# Patient Record
Sex: FEMALE | Race: WHITE | NOT HISPANIC OR LATINO | Employment: UNEMPLOYED | URBAN - METROPOLITAN AREA
[De-identification: names, ages, dates, MRNs, and addresses within clinical notes are randomized per-mention and may not be internally consistent; named-entity substitution may affect disease eponyms.]

---

## 2018-09-07 ENCOUNTER — OFFICE VISIT (OUTPATIENT)
Dept: URGENT CARE | Facility: URGENT CARE | Age: 28
End: 2018-09-07
Payer: COMMERCIAL

## 2018-09-07 VITALS
OXYGEN SATURATION: 96 % | SYSTOLIC BLOOD PRESSURE: 151 MMHG | TEMPERATURE: 100 F | WEIGHT: 246 LBS | HEART RATE: 99 BPM | DIASTOLIC BLOOD PRESSURE: 106 MMHG | BODY MASS INDEX: 48.04 KG/M2

## 2018-09-07 DIAGNOSIS — J01.90 ACUTE NON-RECURRENT SINUSITIS, UNSPECIFIED LOCATION: ICD-10-CM

## 2018-09-07 DIAGNOSIS — J22 LOWER RESPIRATORY INFECTION: ICD-10-CM

## 2018-09-07 DIAGNOSIS — J02.9 SORETHROAT: Primary | ICD-10-CM

## 2018-09-07 DIAGNOSIS — R03.0 ELEVATED BLOOD PRESSURE READING WITHOUT DIAGNOSIS OF HYPERTENSION: ICD-10-CM

## 2018-09-07 PROBLEM — E66.01 MORBID OBESITY (H): Status: ACTIVE | Noted: 2018-09-07

## 2018-09-07 LAB
DEPRECATED S PYO AG THROAT QL EIA: NORMAL
SPECIMEN SOURCE: NORMAL

## 2018-09-07 PROCEDURE — 87081 CULTURE SCREEN ONLY: CPT | Performed by: FAMILY MEDICINE

## 2018-09-07 PROCEDURE — 87880 STREP A ASSAY W/OPTIC: CPT | Performed by: FAMILY MEDICINE

## 2018-09-07 PROCEDURE — 99203 OFFICE O/P NEW LOW 30 MIN: CPT | Performed by: FAMILY MEDICINE

## 2018-09-07 RX ORDER — AZITHROMYCIN 250 MG/1
TABLET, FILM COATED ORAL
Qty: 6 TABLET | Refills: 0 | Status: SHIPPED | OUTPATIENT
Start: 2018-09-07

## 2018-09-07 ASSESSMENT — PAIN SCALES - GENERAL: PAINLEVEL: NO PAIN (0)

## 2018-09-07 NOTE — MR AVS SNAPSHOT
"              After Visit Summary   2018    Dahlia Matias    MRN: [id number]           Patient Information     Date Of Birth          1990        Visit Information        Provider Department      2018 7:45 PM Claudine Mcleod MD [facility]        Today's Diagnoses     Sorethroat    -  1    Elevated blood pressure reading without diagnosis of hypertension        Lower respiratory infection        Acute non-recurrent sinusitis, unspecified location           Follow-ups after your visit        Who to contact     If you have questions or need follow up information about today's clinic visit or your schedule please contact [facility] directly at [phone].  Normal or non-critical lab and imaging results will be communicated to you by MyChart, letter or phone within 4 business days after the clinic has received the results. If you do not hear from us within 7 days, please contact the clinic through MyChart or phone. If you have a critical or abnormal lab result, we will notify you by phone as soon as possible.  Submit refill requests through [employer] or call your pharmacy and they will forward the refill request to us. Please allow 3 business days for your refill to be completed.          Additional Information About Your Visit        MyChart Information     [employer] lets you send messages to your doctor, view your test results, renew your prescriptions, schedule appointments and more. To sign up, go to [email or web address] . Click on \"Log in\" on the left side of the screen, which will take you to the Welcome page. Then click on \"Sign up Now\" on the right side of the page.     You will be asked to enter the access code listed below, as well as some personal information. Please follow the directions to create your username and password.     Your access code is: [email or web address]  Expires: 2018  8:21 PM     Your access code will  in 90 days. If you need help or " a new code, please call your [facility] or [phone].        Care EveryWhere ID     This is your Care EveryWhere ID. This could be used by other organizations to access your [facility] medical records  [id number]        Your Vitals Were     Pulse Temperature Pulse Oximetry BMI (Body Mass Index)          99 100  F (37.8  C) (Oral) 96% 48.04 kg/m2         Blood Pressure from Last 3 Encounters:   09/07/18 (!) 151/106   07/10/13 118/70   05/13/13 (!) 148/93    Weight from Last 3 Encounters:   09/07/18 246 lb (111.6 kg)   07/10/13 196 lb 1.6 oz (89 kg)   04/03/13 191 lb (86.6 kg)              We Performed the Following     Beta strep group A culture     Rapid strep screen          Today's Medication Changes          These changes are accurate as of 9/7/18  8:22 PM.  If you have any questions, ask your nurse or doctor.               Start taking these medicines.        Dose/Directions    azithromycin 250 MG tablet   Commonly known as:  ZITHROMAX   Used for:  Acute non-recurrent sinusitis, unspecified location, Lower respiratory infection   Started by:  Claudine Mcleod MD        2 tablets the first day, then 1 tablet daily for the next 4 days   Quantity:  6 tablet   Refills:  0         Stop taking these medicines if you haven't already. Please contact your care team if you have questions.     MIRENA (52 MG) 20 MCG/24HR IUD   Generic drug:  levonorgestrel   Stopped by:  Claudine Mcleod MD                Where to get your medicines      These medications were sent to [id number] [place] - [address] [facility] [address]  [address] [facility] [employer] [address]    Hours:  24-hours Phone:  [phone]     azithromycin 250 MG tablet                Primary Care Provider Office Phone # Fax #    [facility] [phone] [address] [facility] [address]        Equal Access to Services     RACHEL WALKER AH: Gordon patel  bishop Patel, wavivian meliaeula, qaseamus kawu koch, aurora jasmynin hayaan charlenecabrera landrum laelainepayton shira. So [facility] [phone].    ATENCIÓN: Si habla español, tiene a adams disposición servicios gratuitos de asistencia lingüística. Larissa al [phone].    We comply with applicable federal civil rights laws and [place] laws. We do not discriminate on the basis of race, color, national origin, age, disability, sex, sexual orientation, or gender identity.            Thank you!     Thank you for choosing [facility]  [facility] your [facility]. Our goal is always to provide you with excellent care. Hearing back from our patients is one way we can continue to improve our services. Please take a few minutes to complete the written survey that you may receive in the mail after your visit with us. Thank you!             Your Updated Medication List - Protect others around you: Learn how to safely use, store and throw away your medicines at www.disposemymeds.org.          This list is accurate as of 9/7/18  8:22 PM.  Always use your most recent med list.                   Brand Name Dispense Instructions for use Diagnosis    azithromycin 250 MG tablet    ZITHROMAX    6 tablet    2 tablets the first day, then 1 tablet daily for the next 4 days    Acute non-recurrent sinusitis, unspecified location, Lower respiratory infection

## 2018-09-07 NOTE — LETTER
September 9, 2018      Dahlia M Florencio  [address] [facility] [address]        Dear ,    We are writing to inform you of your test results.    Your throat culture came back normal.     Resulted Orders   Rapid strep screen   Result Value Ref Range    Specimen Description Throat     Rapid Strep A Screen       NEGATIVE: No Group A streptococcal antigen detected by immunoassay, await culture report.   Beta strep group A culture   Result Value Ref Range    Specimen Description Throat     Culture Micro No beta hemolytic Streptococcus Group A isolated        If you have any questions or concerns, please call the clinic at the number listed above.       Sincerely,        Claudine Mcleod MD/EC CMA

## 2018-09-08 LAB
BACTERIA SPEC CULT: NORMAL
SPECIMEN SOURCE: NORMAL

## 2018-09-08 NOTE — PROGRESS NOTES
New patient  Chief complaint: cough    Started off as a cold one to two weeks ago  But then the cough as persisted  And sinus congestion worsened  Won't go away  Just noticed the fever today  Denies any possibility of pregnancy declined a pregnancy test  Sore throat  fever  - Yes  cough  -Yes  ill contacts - No  able to swallow liquids and solids -YES  other symptoms above  Rash: No  Has tried over the counter medications no relief  because of persistence, patient came in to be seen.    BP elevated today but asymptomatic. No headache no blurring of vision no chest pain no shortness of breath no dizziness no unsteadiness no numbness no weakness      ROS:  denies any exertional chest pain or shortness of breath  denies any unusual rash or joint swelling  denies post-tussive emesis or pertussis like symptoms  Negative for constitutional, eye, ear, nose, throat, skin, respiratory, cardiac, and gastrointestinal other than those outlined in the HPI.    PMH: chart reviewed  FH: chart reviewed    SH: chart reviewed and as above   Physical Exam:   BP (!) 151/106  Pulse 99  Temp 100  F (37.8  C) (Oral)  Wt 246 lb (111.6 kg)  SpO2 96%  BMI 48.04 kg/m2  General : Awake Alert not in any acute cardiorespiratory distress  Head:       Normocephalic Atraumatic  Eyes:    Pupils equally reactive to light and accomodation. Sclera not icteric.   ENT:   midline nasal septum, mild nasal congestion, sinuses  Slightly tender  left ear: no tragal tenderness, no mastoid tenderness, normal EAC, normal TM  right ear: left ear: no tragal tenderness, no mastoid tenderness, normal EAC, normal TM  mouth moist buccal mucosa, Yes hyperemic posterior pharyngeal wall, no trismus  tonsils: tonsils are present and normal  anterior cervical nodes: No tender  posterior cervical nodes: No  palpable  Heart:  Regular in rate and rhythm, no murmurs rubs or gallops  Lungs: Symmetrical Chest Expansion, no retractions, clear breath sounds  Abdomen: soft, no  hepatosplenomegally  Psych: Appropriate mood and affect. Pleasant  Skin: patient undressed to level of his/her comfort. No visible concerning lesions.    Labs: Strep negative      ICD-10-CM    1. Sorethroat J02.9 Rapid strep screen     Beta strep group A culture   2. Elevated blood pressure reading without diagnosis of hypertension R03.0    3. Lower respiratory infection J22 azithromycin (ZITHROMAX) 250 MG tablet   4. Acute non-recurrent sinusitis, unspecified location J01.90 azithromycin (ZITHROMAX) 250 MG tablet     >2 weeks of symptoms sinus and cough   Strep negative. Prescribed with zithromax to cover atypicals  Discussed doing a chest xray - she declines at this time. She will follow up if symptoms persist  Alarm signs or symptoms discussed, if present recommend go to ER   Patient given a prescription for zithromax. Side effects of antibiotic discussed. Aware of small but statistically significant increase cardiovascular risk with antibiotic.  Your blood pressure reading was elevated today.  Recommend that you have it re-checked either at home or at our clinic within a week  Avoid cold medicines that have pseudoephedrin in it, or Sudafed. You may take regular or plain Robitussin or Mucinex  If you are unsure, please ask the pharmacist    If your blood pressure top number (systolic) 180 and above, or the bottom number (diastolic) 120 and above, you need to be seen immediately.  If persistently elevated top number 140 and above, or the bottom number 90 and above, please schedule an appointment to see a provider in clinic within a week.  If you have very elevated blood pressures, accompanied by headache, chest pain, numbness, weakness, slurring of speech, confusion, difficulty walking, call 911 and go to the ER  supportive treatment: advised supportive treatment, Advised to come back in if with any worsening symptoms or if not better despite supportive measures. Especially if with any worsening sore throat,  inability to eat or drink or swallow, or trismus. Symptoms of peritonsillar abscess discussed. Patient voiced understanding.  adverse reactions of medication discussed  OTC medications discussed  advised to come back in right away if with any worsening symptoms or if with no relief despite treatment plan  patient voiced understanding and had no further questions at this time.

## 2018-09-08 NOTE — NURSING NOTE
Chief Complaint   Patient presents with     Pharyngitis     C/o sore throat, fatigue, chills, body aches, fever, cough x 3 weeks.       Initial BP (!) 151/106  Pulse 99  Temp 100  F (37.8  C) (Oral)  Wt 246 lb (111.6 kg)  SpO2 96%  BMI 48.04 kg/m2 Estimated body mass index is 48.04 kg/(m^2) as calculated from the following:    Height as of 4/3/13: 5' (1.524 m).    Weight as of this encounter: 246 lb (111.6 kg)..  BP completed using cuff size: tito [place], PAWAN

## 2018-11-20 ENCOUNTER — TRANSFERRED RECORDS (OUTPATIENT)
Dept: HEALTH INFORMATION MANAGEMENT | Facility: CLINIC | Age: 28
End: 2018-11-20

## 2018-11-20 LAB
CHOLEST SERPL-MCNC: 189 MG/DL (ref 100–199)
CREAT SERPL-MCNC: 0.81 MG/DL (ref 0.57–1.11)
GFR SERPL CREATININE-BSD FRML MDRD: >60 ML/MIN/1.73M2
GLUCOSE SERPL-MCNC: 80 MG/DL (ref 65–100)
HDLC SERPL-MCNC: 34 MG/DL
LDLC SERPL CALC-MCNC: 109 MG/DL
NONHDLC SERPL-MCNC: 155 MG/DL
PAP-ABSTRACT: NORMAL
POTASSIUM SERPL-SCNC: 3.7 MMOL/L (ref 3.5–5)
TRIGL SERPL-MCNC: 232 MG/DL

## 2024-10-19 ENCOUNTER — OFFICE VISIT (OUTPATIENT)
Dept: URGENT CARE | Facility: URGENT CARE | Age: 34
End: 2024-10-19
Payer: COMMERCIAL

## 2024-10-19 VITALS
OXYGEN SATURATION: 97 % | HEART RATE: 131 BPM | RESPIRATION RATE: 16 BRPM | TEMPERATURE: 102.8 F | SYSTOLIC BLOOD PRESSURE: 143 MMHG | DIASTOLIC BLOOD PRESSURE: 87 MMHG

## 2024-10-19 DIAGNOSIS — R50.9 FEVER, UNSPECIFIED FEVER CAUSE: Primary | ICD-10-CM

## 2024-10-19 PROCEDURE — 99203 OFFICE O/P NEW LOW 30 MIN: CPT | Performed by: FAMILY MEDICINE

## 2024-10-19 RX ORDER — CEPHALEXIN 500 MG/1
500 CAPSULE ORAL 4 TIMES DAILY
Qty: 40 CAPSULE | Refills: 0 | Status: SHIPPED | OUTPATIENT
Start: 2024-10-19 | End: 2024-10-29

## 2024-10-19 NOTE — LETTER
October 20, 2024      Dahlia JIMENES Florencio  [address] [facility] [address]        To Whom It May Concern:    Dahlia Matias was seen on 10/19/2024.  Please excuse her work absence on Monday.    Let us know if there are any questions.      Sincerely,        Eduardo Medina MD

## 2024-10-20 NOTE — PROGRESS NOTES
Assessment & Plan     Fever, unspecified fever cause  34-year-old female presented with right upper thigh pain, fever, chills which started today.  History of skin infections.  Physical examination as described.  Differentials discussed in detail including cellulitis however no skin discoloration noted.  Offered ER transfer for comprehensive evaluation which patient declined.  Cephalexin prescribed and recommended well hydration, warm fluids, over-the-counter analgesia, leg elevation when possible.  Instructed to go ER if symptoms persist or worsen otherwise follow-up with PCP next week.  Patient, mother-in-law understood and in agreement with the above plan.  All questions answered.  - cephALEXin (KEFLEX) 500 MG capsule; Take 1 capsule (500 mg) by mouth 4 times daily for 10 days.      Jaqueline Villagomez is a 34 year old, presenting for the following health issues:  Cellulitis (Right leg and upper thigh)    HPI     Concern -   Onset: today   Description: right upper thigh pain, fever and chills  Intensity: moderate  Progression of Symptoms:  worsening  Accompanying Signs & Symptoms: no cough, sore throat, shortness of breath, diarrhea, constipation, urinary difficulty or other relevant systemic symptoms  Previous history of similar problem: skin infections   Therapies tried and outcome: tylenol         Review of Systems  Constitutional, HEENT, cardiovascular, pulmonary, gi and gu systems are negative, except as otherwise noted.      Objective    BP (!) 143/87   Pulse (!) 131   Temp (!) 102.8  F (39.3  C)   Resp 16   SpO2 97%   There is no height or weight on file to calculate BMI.  Physical Exam   GENERAL: alert and in some distress  EYES: Eyes grossly normal to inspection, PERRL and conjunctivae and sclerae normal  NECK: no adenopathy, no asymmetry, masses, or scars  RESP: lungs clear to auscultation - no rales, rhonchi or wheezes  CV: tachycardia, normal S1 S2, no S3 or S4, and no murmur, click or  rub  ABDOMEN: soft, nontender  MS: Subjective right upper thigh pain, mildly tender on palpation, bilateral pedal edema, no skin discoloration noted, Homans' sign negative  SKIN: As above  NEURO: Normal strength and tone, mentation intact and speech normal  PSYCH: mentation appears normal, affect normal/bright    Signed Electronically by: Eduardo Medina MD

## 2024-11-01 ENCOUNTER — OFFICE VISIT (OUTPATIENT)
Dept: FAMILY MEDICINE | Facility: CLINIC | Age: 34
End: 2024-11-01
Payer: COMMERCIAL

## 2024-11-01 VITALS
HEART RATE: 100 BPM | WEIGHT: 269 LBS | DIASTOLIC BLOOD PRESSURE: 88 MMHG | SYSTOLIC BLOOD PRESSURE: 139 MMHG | TEMPERATURE: 98.9 F | OXYGEN SATURATION: 97 % | RESPIRATION RATE: 18 BRPM | HEIGHT: 63 IN | BODY MASS INDEX: 47.66 KG/M2

## 2024-11-01 DIAGNOSIS — L03.115 CELLULITIS OF RIGHT LOWER EXTREMITY: Primary | ICD-10-CM

## 2024-11-01 LAB
ALBUMIN SERPL BCG-MCNC: 3.9 G/DL (ref 3.5–5.2)
ALP SERPL-CCNC: 93 U/L (ref 40–150)
ALT SERPL W P-5'-P-CCNC: 32 U/L (ref 0–50)
ANION GAP SERPL CALCULATED.3IONS-SCNC: 9 MMOL/L (ref 7–15)
AST SERPL W P-5'-P-CCNC: 28 U/L (ref 0–45)
BASOPHILS # BLD AUTO: 0.1 10E3/UL (ref 0–0.2)
BASOPHILS NFR BLD AUTO: 1 %
BILIRUB SERPL-MCNC: 0.4 MG/DL
BUN SERPL-MCNC: 10.2 MG/DL (ref 6–20)
CALCIUM SERPL-MCNC: 9.7 MG/DL (ref 8.8–10.4)
CHLORIDE SERPL-SCNC: 103 MMOL/L (ref 98–107)
CREAT SERPL-MCNC: 0.71 MG/DL (ref 0.51–0.95)
EGFRCR SERPLBLD CKD-EPI 2021: >90 ML/MIN/1.73M2
EOSINOPHIL # BLD AUTO: 0.2 10E3/UL (ref 0–0.7)
EOSINOPHIL NFR BLD AUTO: 2 %
ERYTHROCYTE [DISTWIDTH] IN BLOOD BY AUTOMATED COUNT: 13.3 % (ref 10–15)
GLUCOSE SERPL-MCNC: 109 MG/DL (ref 70–99)
HCO3 SERPL-SCNC: 27 MMOL/L (ref 22–29)
HCT VFR BLD AUTO: 38.2 % (ref 35–47)
HGB BLD-MCNC: 12.3 G/DL (ref 11.7–15.7)
IMM GRANULOCYTES # BLD: 0.1 10E3/UL
IMM GRANULOCYTES NFR BLD: 1 %
LYMPHOCYTES # BLD AUTO: 1.9 10E3/UL (ref 0.8–5.3)
LYMPHOCYTES NFR BLD AUTO: 20 %
MCH RBC QN AUTO: 28.1 PG (ref 26.5–33)
MCHC RBC AUTO-ENTMCNC: 32.2 G/DL (ref 31.5–36.5)
MCV RBC AUTO: 87 FL (ref 78–100)
MONOCYTES # BLD AUTO: 0.6 10E3/UL (ref 0–1.3)
MONOCYTES NFR BLD AUTO: 6 %
NEUTROPHILS # BLD AUTO: 6.9 10E3/UL (ref 1.6–8.3)
NEUTROPHILS NFR BLD AUTO: 71 %
PLATELET # BLD AUTO: 559 10E3/UL (ref 150–450)
POTASSIUM SERPL-SCNC: 4.2 MMOL/L (ref 3.4–5.3)
PROT SERPL-MCNC: 7.1 G/DL (ref 6.4–8.3)
RBC # BLD AUTO: 4.38 10E6/UL (ref 3.8–5.2)
SODIUM SERPL-SCNC: 139 MMOL/L (ref 135–145)
WBC # BLD AUTO: 9.8 10E3/UL (ref 4–11)

## 2024-11-01 PROCEDURE — 36415 COLL VENOUS BLD VENIPUNCTURE: CPT | Performed by: PHYSICIAN ASSISTANT

## 2024-11-01 PROCEDURE — 85025 COMPLETE CBC W/AUTO DIFF WBC: CPT | Performed by: PHYSICIAN ASSISTANT

## 2024-11-01 PROCEDURE — 80053 COMPREHEN METABOLIC PANEL: CPT | Performed by: PHYSICIAN ASSISTANT

## 2024-11-01 PROCEDURE — G2211 COMPLEX E/M VISIT ADD ON: HCPCS | Performed by: PHYSICIAN ASSISTANT

## 2024-11-01 PROCEDURE — 99213 OFFICE O/P EST LOW 20 MIN: CPT | Performed by: PHYSICIAN ASSISTANT

## 2024-11-01 RX ORDER — FUROSEMIDE 20 MG/1
20 TABLET ORAL DAILY
COMMUNITY
Start: 2023-12-04

## 2024-11-01 RX ORDER — SULFAMETHOXAZOLE AND TRIMETHOPRIM 800; 160 MG/1; MG/1
1 TABLET ORAL 2 TIMES DAILY
Qty: 20 TABLET | Refills: 0 | Status: SHIPPED | OUTPATIENT
Start: 2024-11-01 | End: 2024-11-11

## 2024-11-01 ASSESSMENT — PAIN SCALES - GENERAL: PAINLEVEL_OUTOF10: NO PAIN (0)

## 2024-11-01 NOTE — PROGRESS NOTES
Assessment & Plan       ICD-10-CM    1. Cellulitis of right lower extremity  L03.115 sulfamethoxazole-trimethoprim (BACTRIM DS) 800-160 MG tablet     CBC with platelets and differential     Comprehensive metabolic panel (BMP + Alb, Alk Phos, ALT, AST, Total. Bili, TP)     CBC with platelets and differential     Comprehensive metabolic panel (BMP + Alb, Alk Phos, ALT, AST, Total. Bili, TP)      Talk to patient about her concerns.  We will change her antibiotics to Bactrim twice a day for 10 days.  Warning signs side effects were discussed.  If her symptoms persist may need follow-up with infectious disease.  Labs are pending.  Recheck 1 wk.  The longitudinal plan of care for the diagnosis(es)/condition(s) as documented were addressed during this visit. Due to the added complexity in care, I will continue to support Dahlia in the subsequent management and with ongoing continuity of care.    Jaqueline Villagomez is a 34 year old, presenting for the following health issues:  Musculoskeletal Problem        11/1/2024    10:13 AM   Additional Questions   Roomed by fer   Accompanied by self         11/1/2024    10:13 AM   Patient Reported Additional Medications   Patient reports taking the following new medications no     History of Present Illness       Reason for visit:  Celltuis    She eats 2-3 servings of fruits and vegetables daily.She consumes 3 sweetened beverage(s) daily.She exercises with enough effort to increase her heart rate 60 or more minutes per day.  She exercises with enough effort to increase her heart rate 6 days per week.   She is taking medications regularly.  Patient is seen here today with a history of hypertension for follow-up of right lower leg cellulitis.  She states has been a recurrent problem for her.  Last was seen in urgent care on 10/19.  She was given Keflex did help her symptoms but never fully resolved.  This is been a recurrent problem for her in the past when she was pregnant a  "couple years ago.  She states it took a couple rounds of antibiotics to help this calm down.  She denies any fevers chills or bodyaches.  She states is very tender.      Review of Systems  Constitutional, HEENT, cardiovascular, pulmonary, gi and gu systems are negative, except as otherwise noted.      Objective    /88   Pulse 100   Temp 98.9  F (37.2  C) (Tympanic)   Resp 18   Ht 1.6 m (5' 3\")   Wt 122 kg (269 lb)   SpO2 97%   BMI 47.65 kg/m    Body mass index is 47.65 kg/m .  Physical Exam   GENERAL: alert and no distress-obese female  Examination of her right lower leg she has a large area of erythematous raised tender area.  Calves are soft nontender neuro vas intact distally.  Picture was taken for her chart for reference.    Labs pending        Signed Electronically by: Kleber Mix PA-C    "

## 2024-11-03 ENCOUNTER — HEALTH MAINTENANCE LETTER (OUTPATIENT)
Age: 34
End: 2024-11-03

## 2024-11-06 NOTE — RESULT ENCOUNTER NOTE
Ms. Matias,    All of your labs were normal/near normal for you.    Please contact the clinic if you have additional questions.  Thank you.    Sincerely,    Kleber Mix PA-C

## 2024-12-05 ENCOUNTER — TELEPHONE (OUTPATIENT)
Dept: FAMILY MEDICINE | Facility: CLINIC | Age: 34
End: 2024-12-05

## 2024-12-05 ENCOUNTER — OFFICE VISIT (OUTPATIENT)
Dept: FAMILY MEDICINE | Facility: CLINIC | Age: 34
End: 2024-12-05
Payer: COMMERCIAL

## 2024-12-05 VITALS
SYSTOLIC BLOOD PRESSURE: 144 MMHG | TEMPERATURE: 99 F | HEIGHT: 63 IN | DIASTOLIC BLOOD PRESSURE: 92 MMHG | HEART RATE: 85 BPM | OXYGEN SATURATION: 98 % | BODY MASS INDEX: 48.02 KG/M2 | WEIGHT: 271 LBS | RESPIRATION RATE: 16 BRPM

## 2024-12-05 DIAGNOSIS — Z51.89 VISIT FOR WOUND CHECK: Primary | ICD-10-CM

## 2024-12-05 DIAGNOSIS — E66.01 MORBID OBESITY (H): ICD-10-CM

## 2024-12-05 DIAGNOSIS — I10 HYPERTENSION GOAL BP (BLOOD PRESSURE) < 140/90: ICD-10-CM

## 2024-12-05 ASSESSMENT — PAIN SCALES - GENERAL: PAINLEVEL_OUTOF10: NO PAIN (0)

## 2024-12-05 NOTE — TELEPHONE ENCOUNTER
Prior Authorization Retail Medication Request    Medication/Dose: Zepbound 2.5mg  Diagnosis and ICD code (if different than what is on RX):    New/renewal/insurance change PA/secondary ins. PA:  Previously Tried and Failed:    Rationale:      Insurance   Primary [facility]  Insurance ID:  [id number]    Thank You  Vandana Fierro, [facility]  [phone]

## 2024-12-05 NOTE — PROGRESS NOTES
Assessment & Plan       ICD-10-CM    1. Visit for wound check  Z51.89       2. Morbid obesity (H)  E66.01 tirzepatide-Weight Management (ZEPBOUND) 2.5 MG/0.5ML prefilled pen     tirzepatide-Weight Management (ZEPBOUND) 5 MG/0.5ML prefilled pen      3. Hypertension goal BP (blood pressure) < 140/90  I10       1.  We continue to discuss conservative wound care treatment and watching for signs of cellulitis to return.  2.  We talked about GLP-1 weekly injectables warning signs side effects discussed.  This would based on insurance coverage.  Follow-up in 2 months after starting medication.  3.  Blood pressure is elevated today we will continue monitor this at home.  We talked about diet and exercise and low-salt diet.  Recheck again at follow-up.    The longitudinal plan of care for the diagnosis(es)/condition(s) as documented were addressed during this visit. Due to the added complexity in care, I will continue to support Dahlia in the subsequent management and with ongoing continuity of care.    Jaqueline Villagomez is a 34 year old, presenting for the following health issues:  RECHECK    History of Present Illness       Reason for visit:  Cellutis    She eats 2-3 servings of fruits and vegetables daily.She consumes 2 sweetened beverage(s) daily.She exercises with enough effort to increase her heart rate 60 or more minutes per day.  She exercises with enough effort to increase her heart rate 7 days per week.   She is taking medications regularly.  Patient comes in today for follow-up of right lower leg cellulitis.  She finished her antibiotics and overall she states she is doing much better.  She continues to have scab formation she has been treated with lotion no recent fevers chills or bodyaches or any erythema or drainage.    She is also interested in GLP-1 will weekly injectables if her insurance covers it.  She states she has a friend has been on it and had successful weight loss.      Review of  "Systems  Constitutional, HEENT, cardiovascular, pulmonary, gi and gu systems are negative, except as otherwise noted.      Objective    BP (!) 144/92   Pulse 85   Temp 99  F (37.2  C) (Tympanic)   Resp 16   Ht 1.6 m (5' 3\")   Wt 122.9 kg (271 lb)   SpO2 98%   BMI 48.01 kg/m    Body mass index is 48.01 kg/m .  Physical Exam   GENERAL: alert and no distress  Right lower leg: Healing scab diffusely in the lower leg no erythema no tenderness or drainage diffuse edema in general bilaterally.  Calves soft nontender neuro vas intact distally.              Signed Electronically by: Kleber Mix PA-C    "

## 2024-12-06 NOTE — TELEPHONE ENCOUNTER
PA Initiation    Medication: ZEPBOUND 2.5 MG/0.5ML SC SOAJ  Insurance Company: [employer] ([facility]) - Phone [phone] Fax [phone]  Pharmacy Filling the Rx:    Filling Pharmacy Phone:    Filling Pharmacy Fax:    Start Date: 12/6/2024

## 2024-12-09 NOTE — TELEPHONE ENCOUNTER
PRIOR AUTHORIZATION DENIED    Medication: ZEPBOUND 2.5 MG/0.5ML SC SOAJ  Insurance Company: [employer] ([facility]) - Phone [phone] Fax [phone]  Denial Date: 12/6/2024  Denial Reason(s): excluded drug from plan  Appeal Information: see attached  Patient Notified: no

## 2025-02-06 ENCOUNTER — OFFICE VISIT (OUTPATIENT)
Dept: FAMILY MEDICINE | Facility: CLINIC | Age: 35
End: 2025-02-06
Payer: COMMERCIAL

## 2025-02-06 ENCOUNTER — TELEPHONE (OUTPATIENT)
Dept: FAMILY MEDICINE | Facility: CLINIC | Age: 35
End: 2025-02-06

## 2025-02-06 VITALS
RESPIRATION RATE: 16 BRPM | OXYGEN SATURATION: 97 % | HEIGHT: 63 IN | BODY MASS INDEX: 46.78 KG/M2 | TEMPERATURE: 97.8 F | WEIGHT: 264 LBS | HEART RATE: 82 BPM | SYSTOLIC BLOOD PRESSURE: 154 MMHG | DIASTOLIC BLOOD PRESSURE: 101 MMHG

## 2025-02-06 DIAGNOSIS — I10 HYPERTENSION GOAL BP (BLOOD PRESSURE) < 140/90: Primary | ICD-10-CM

## 2025-02-06 DIAGNOSIS — R60.0 BILATERAL LEG EDEMA: ICD-10-CM

## 2025-02-06 DIAGNOSIS — E66.01 MORBID OBESITY (H): ICD-10-CM

## 2025-02-06 RX ORDER — FUROSEMIDE 20 MG/1
20 TABLET ORAL
Qty: 60 TABLET | Refills: 0 | Status: SHIPPED | OUTPATIENT
Start: 2025-02-06

## 2025-02-06 RX ORDER — TIRZEPATIDE 2.5 MG/.5ML
2.5 INJECTION, SOLUTION SUBCUTANEOUS
Qty: 2 ML | Refills: 0 | Status: SHIPPED | OUTPATIENT
Start: 2025-02-06 | End: 2025-03-08

## 2025-02-06 ASSESSMENT — PAIN SCALES - GENERAL: PAINLEVEL_OUTOF10: MILD PAIN (2)

## 2025-02-06 NOTE — PROGRESS NOTES
"  Assessment & Plan       ICD-10-CM    1. Hypertension goal BP (blood pressure) < 140/90  I10 furosemide (LASIX) 20 MG tablet     Tirzepatide (MOUNJARO) 2.5 MG/0.5ML SOAJ      2. Bilateral leg edema  R60.0 furosemide (LASIX) 20 MG tablet      3. Morbid obesity (H)  E66.01 Tirzepatide (MOUNJARO) 2.5 MG/0.5ML SOAJ      Talk to patient about her concerns.  Working to increase her Lasix to 20 mg twice daily and recheck in 2 weeks.  Warning signs side effects were discussed.  I reordered Zepbound.  Warning signs side effects were discussed recheck in a month.      BMI  Estimated body mass index is 46.77 kg/m  as calculated from the following:    Height as of this encounter: 1.6 m (5' 3\").    Weight as of this encounter: 119.7 kg (264 lb).   Weight management plan: Discussed healthy diet and exercise guidelines      The longitudinal plan of care for the diagnosis(es)/condition(s) as documented were addressed during this visit. Due to the added complexity in care, I will continue to support Dahlia in the subsequent management and with ongoing continuity of care.    Jaqueline Villagomez is a 34 year old, presenting for the following health issues:  RECHECK    History of Present Illness       Reason for visit:  Cellutis    She eats 2-3 servings of fruits and vegetables daily.She consumes 1 sweetened beverage(s) daily.She exercises with enough effort to increase her heart rate 60 or more minutes per day.  She exercises with enough effort to increase her heart rate 6 days per week.   She is taking medications regularly.  Patient is here for follow-up of a history of right leg cellulitis.  She is just double check to make sure is not getting worse.  She has not noticed any increased pain or swelling or redness.  She has a history of bilateral leg edema and on Lasix 20 mg a day.  She does not feel it is helping much.  She also has a history of hypertension that has not been controlled.  She has been changing insurance and will " "legs not found to be ordered as she believes is covered by insurance now.  She denies any fevers chills or bodyaches along with no signs of chest pain shortness of breath.    Review of Systems  Constitutional, HEENT, cardiovascular, pulmonary, gi and gu systems are negative, except as otherwise noted.      Objective    BP (!) 154/101   Pulse 82   Temp 97.8  F (36.6  C) (Tympanic)   Resp 16   Ht 1.6 m (5' 3\")   Wt 119.7 kg (264 lb)   SpO2 97%   BMI 46.77 kg/m    Body mass index is 46.77 kg/m .  Physical Exam   GENERAL: alert and no distress  RESP: lungs clear to auscultation - no rales, rhonchi or wheezes  CV: regular rate and rhythm, normal S1 S2, no S3 or S4, no murmur, click or rub, bart peripheral edema .  She has some slight leftover scarring from her previous cellulitis no tenderness of her left lower medial shin region.  Calves are soft nontender neuro vas intact distally.        Signed Electronically by: Kleber Mix PA-C    "

## 2025-02-06 NOTE — TELEPHONE ENCOUNTER
Prior Authorization Retail Medication Request    Medication/Dose: Mounjaro 2.5mg  Diagnosis and ICD code (if different than what is on RX):    New/renewal/insurance change PA/secondary ins. PA:  Previously Tried and Failed:    Rationale:      Insurance   Primary: [employer]  Insurance ID:  [id number]    Thank You  Vandana Fierro [facility]  [phone]

## 2025-02-08 NOTE — TELEPHONE ENCOUNTER
PA Initiation    Medication: MOUNJARO 2.5 MG/0.5ML SC SOAJ  Insurance Company: [employer]ty PA's - Phone [phone] Fax [phone]  Pharmacy Filling the Rx: [place], [place] - [address] [facility], [address]  Filling Pharmacy Phone: [phone]  Filling Pharmacy Fax:    Start Date: 2/8/2025

## 2025-02-11 NOTE — TELEPHONE ENCOUNTER
PRIOR AUTHORIZATION DENIED    Medication: MOUNJARO 2.5 MG/0.5ML SC SOAJ  Insurance Company: [employer] PA's - Phone [phone] Fax [phone]  Denial Date: 2/8/2025  Denial Reason(s):     Appeal Information:     Patient Notified: No

## 2025-02-20 ENCOUNTER — OFFICE VISIT (OUTPATIENT)
Dept: FAMILY MEDICINE | Facility: CLINIC | Age: 35
End: 2025-02-20
Payer: COMMERCIAL

## 2025-02-20 VITALS
TEMPERATURE: 99 F | BODY MASS INDEX: 47.84 KG/M2 | OXYGEN SATURATION: 97 % | HEIGHT: 63 IN | DIASTOLIC BLOOD PRESSURE: 90 MMHG | WEIGHT: 270 LBS | RESPIRATION RATE: 16 BRPM | SYSTOLIC BLOOD PRESSURE: 150 MMHG | HEART RATE: 88 BPM

## 2025-02-20 DIAGNOSIS — R60.0 BILATERAL LEG EDEMA: ICD-10-CM

## 2025-02-20 DIAGNOSIS — I10 HYPERTENSION GOAL BP (BLOOD PRESSURE) < 140/90: Primary | ICD-10-CM

## 2025-02-20 DIAGNOSIS — E66.01 MORBID OBESITY (H): ICD-10-CM

## 2025-02-20 LAB
ANION GAP SERPL CALCULATED.3IONS-SCNC: 13 MMOL/L (ref 7–15)
BUN SERPL-MCNC: 11.9 MG/DL (ref 6–20)
CALCIUM SERPL-MCNC: 9.5 MG/DL (ref 8.8–10.4)
CHLORIDE SERPL-SCNC: 103 MMOL/L (ref 98–107)
CREAT SERPL-MCNC: 0.73 MG/DL (ref 0.51–0.95)
EGFRCR SERPLBLD CKD-EPI 2021: >90 ML/MIN/1.73M2
GLUCOSE SERPL-MCNC: 123 MG/DL (ref 70–99)
HCO3 SERPL-SCNC: 23 MMOL/L (ref 22–29)
POTASSIUM SERPL-SCNC: 3.9 MMOL/L (ref 3.4–5.3)
SODIUM SERPL-SCNC: 139 MMOL/L (ref 135–145)

## 2025-02-20 RX ORDER — LISINOPRIL 10 MG/1
10 TABLET ORAL DAILY
Qty: 30 TABLET | Refills: 0 | Status: SHIPPED | OUTPATIENT
Start: 2025-02-20

## 2025-02-20 RX ORDER — FUROSEMIDE 20 MG/1
20 TABLET ORAL
Qty: 180 TABLET | Refills: 0 | Status: SHIPPED | OUTPATIENT
Start: 2025-02-20 | End: 2025-05-21

## 2025-02-20 ASSESSMENT — PAIN SCALES - GENERAL: PAINLEVEL_OUTOF10: NO PAIN (0)

## 2025-02-20 NOTE — PROGRESS NOTES
Assessment & Plan       ICD-10-CM    1. Hypertension goal BP (blood pressure) < 140/90  I10 Basic metabolic panel  (Ca, Cl, CO2, Creat, Gluc, K, Na, BUN)     lisinopril (ZESTRIL) 10 MG tablet     furosemide (LASIX) 20 MG tablet     Adult Comprehensive Weight Management  Referral     Basic metabolic panel  (Ca, Cl, CO2, Creat, Gluc, K, Na, BUN)      2. Bilateral leg edema  R60.0 furosemide (LASIX) 20 MG tablet     Lymphedema Therapy  Referral      3. Morbid obesity (H)  E66.01 Adult Comprehensive Weight Management  Referral      Work on Healthy diet and exercise. Getting heart rate elevated for 30 mins most days of week.  Will add lisinopril. warning signs discussed. side effects discussed recheck blood pressure in 4 wk  2. Referral placed   3. Referral placed.     The longitudinal plan of care for the diagnosis(es)/condition(s) as documented were addressed during this visit. Due to the added complexity in care, I will continue to support Dahlia in the subsequent management and with ongoing continuity of care.    Subjective   Dahlia is a 34 year old, presenting for the following health issues:  Hypertension        2/20/2025    11:08 AM   Additional Questions   Roomed by Zaina   Accompanied by self         2/20/2025    11:08 AM   Patient Reported Additional Medications   Patient reports taking the following new medications no     History of Present Illness       Reason for visit:  Cellutis    She eats 4 or more servings of fruits and vegetables daily.She consumes 2 sweetened beverage(s) daily.She exercises with enough effort to increase her heart rate 60 or more minutes per day.  She exercises with enough effort to increase her heart rate 7 days per week.   She is taking medications regularly.     Hypertension Follow-up    Do you check your blood pressure regularly outside of the clinic? Yes   Are you following a low salt diet? Yes  Are your blood pressures ever more than 140 on the top  "number (systolic) OR more   than 90 on the bottom number (diastolic), for example 140/90? Yes  Patient follow-up today for hypertension she had recently increased her Lasix to twice a day.  She has not noticed much changes in her edema.  No chest pain or short of breath. No headache or dizziness.     Weight:   GLP-1 medications not covered.     Review of Systems  Constitutional, HEENT, cardiovascular, pulmonary, gi and gu systems are negative, except as otherwise noted.      Objective    BP (!) 150/90   Pulse 88   Temp 99  F (37.2  C) (Tympanic)   Resp 16   Ht 1.6 m (5' 3\")   Wt 122.5 kg (270 lb)   SpO2 97%   BMI 47.83 kg/m    Body mass index is 47.83 kg/m .  Physical Exam   GENERAL: alert and no distress  RESP: lungs clear to auscultation - no rales, rhonchi or wheezes  CV: regular rate and rhythm, normal S1 S2, no S3 or S4, no murmur, click or rub, no peripheral edema  MS: no gross musculoskeletal defects noted, bart edema    Labs pending        Signed Electronically by: Kleber Mix PA-C    "

## 2025-02-20 NOTE — PROGRESS NOTES
{PROVIDER CHARTING PREFERENCE:059656}    Jaqueline Villagomez is a 34 year old, presenting for the following health issues:  Hypertension      2/20/2025    11:08 AM   Additional Questions   Roomed by Zaina   Accompanied by self         2/20/2025    11:08 AM   Patient Reported Additional Medications   Patient reports taking the following new medications no     HPI     {MA/LPN/RN Pre-Provider Visit Orders- hCG/UA/Strep (Optional):013851}  {SUPERLIST (Optional):823010}  {additonal problems for provider to add (Optional):280333}    {ROS Picklists (Optional):781415}      Objective    There were no vitals taken for this visit.  There is no height or weight on file to calculate BMI.  Physical Exam   {Exam List (Optional):928506}    {Diagnostic Test Results (Optional):150500}        Signed Electronically by: Kleber Mix PA-C  {Email feedback regarding this note to [email or web address]   :099373}

## 2025-03-13 DIAGNOSIS — I10 HYPERTENSION GOAL BP (BLOOD PRESSURE) < 140/90: ICD-10-CM

## 2025-03-13 RX ORDER — LISINOPRIL 10 MG/1
10 TABLET ORAL DAILY
Qty: 30 TABLET | Refills: 0 | Status: SHIPPED | OUTPATIENT
Start: 2025-03-13

## 2025-03-19 ENCOUNTER — E-VISIT (OUTPATIENT)
Dept: FAMILY MEDICINE | Facility: CLINIC | Age: 35
End: 2025-03-19
Payer: COMMERCIAL

## 2025-03-19 DIAGNOSIS — R69 DIAGNOSIS DEFERRED: Primary | ICD-10-CM

## 2025-03-19 PROCEDURE — 99207 PR NON-BILLABLE SERV PER CHARTING: CPT | Performed by: PHYSICIAN ASSISTANT

## 2025-03-20 ENCOUNTER — OFFICE VISIT (OUTPATIENT)
Dept: FAMILY MEDICINE | Facility: CLINIC | Age: 35
End: 2025-03-20
Payer: COMMERCIAL

## 2025-03-20 VITALS
DIASTOLIC BLOOD PRESSURE: 90 MMHG | RESPIRATION RATE: 18 BRPM | WEIGHT: 266 LBS | OXYGEN SATURATION: 99 % | SYSTOLIC BLOOD PRESSURE: 150 MMHG | HEART RATE: 91 BPM | BODY MASS INDEX: 47.13 KG/M2 | HEIGHT: 63 IN | TEMPERATURE: 98.7 F

## 2025-03-20 DIAGNOSIS — I10 HYPERTENSION GOAL BP (BLOOD PRESSURE) < 140/90: ICD-10-CM

## 2025-03-20 DIAGNOSIS — J40 BRONCHITIS: Primary | ICD-10-CM

## 2025-03-20 RX ORDER — AZITHROMYCIN 250 MG/1
TABLET, FILM COATED ORAL
Qty: 6 TABLET | Refills: 0 | Status: SHIPPED | OUTPATIENT
Start: 2025-03-20 | End: 2025-03-25

## 2025-03-20 RX ORDER — ALBUTEROL SULFATE 90 UG/1
2 INHALANT RESPIRATORY (INHALATION) EVERY 6 HOURS PRN
Qty: 18 G | Refills: 0 | Status: SHIPPED | OUTPATIENT
Start: 2025-03-20

## 2025-03-20 RX ORDER — LISINOPRIL 20 MG/1
20 TABLET ORAL DAILY
Qty: 30 TABLET | Refills: 0 | Status: SHIPPED | OUTPATIENT
Start: 2025-03-20

## 2025-03-20 ASSESSMENT — PAIN SCALES - GENERAL: PAINLEVEL_OUTOF10: MILD PAIN (3)

## 2025-03-20 NOTE — PROGRESS NOTES
Assessment & Plan       ICD-10-CM    1. Bronchitis  J40 azithromycin (ZITHROMAX) 250 MG tablet     albuterol (PROAIR HFA/PROVENTIL HFA/VENTOLIN HFA) 108 (90 Base) MCG/ACT inhaler      2. Hypertension goal BP (blood pressure) < 140/90  I10 lisinopril (ZESTRIL) 20 MG tablet      Warning signs discussed. Side effects discussed. Symptomatic treatment such as pushing fluids. Ok for over the counter acetaminophen and /or non-steroidal anti-inflammatory medication as needed. Recheck 2 wks as needed  Will increase lisinopril to 20mg daily. warning signs discussed. side effects discussed. Recheck blood pressure with RN in 2 wks.     The longitudinal plan of care for the diagnosis(es)/condition(s) as documented were addressed during this visit. Due to the added complexity in care, I will continue to support Dahlia in the subsequent management and with ongoing continuity of care.  Jaqueline Villagomez is a 34 year old, presenting for the following health issues:  Hypertension and URI        3/20/2025    11:31 AM   Additional Questions   Roomed by fer   Accompanied by self         3/20/2025    11:31 AM   Patient Reported Additional Medications   Patient reports taking the following new medications no     History of Present Illness       Reason for visit:  Coughing  Symptom onset:  1-2 weeks ago  Symptoms include:  Coughing  Symptom intensity:  Severe  Symptom progression:  Worsening  Had these symptoms before:  Yes  Has tried/received treatment for these symptoms:  Yes  Previous treatment was successful:  No  What makes it worse:  Coughing  What makes it better:  Cough drops   She is taking medications regularly.    Started with fever, that is better now. Worsening cough, wheezing over 10 days. nausea, vomiting, diarrhea.   Posterior nasal drainage with headache.       Hypertension Follow-up    Do you check your blood pressure regularly outside of the clinic? Yes   Are you following a low salt diet? Yes  Are your blood  "pressures ever more than 140 on the top number (systolic) OR more   than 90 on the bottom number (diastolic), for example 140/90? Yes  Blood pressure 150/90 at home.   No chest pain or short of breath. No headache or dizziness.       Review of Systems  Constitutional, HEENT, cardiovascular, pulmonary, gi and gu systems are negative, except as otherwise noted.      Objective    BP (!) 150/90   Pulse 91   Temp 98.7  F (37.1  C) (Tympanic)   Resp 18   Ht 1.6 m (5' 3\")   Wt 120.7 kg (266 lb)   SpO2 99%   BMI 47.12 kg/m    Body mass index is 47.12 kg/m .  Physical Exam   GENERAL: healthy, alert and no distress  Head: Normocephalic, atraumatic.  Eyes: Conjunctiva clear, non icteric. PERRLA.  Ears: External ears and TMs normal BL.  Nose: Septum midline, nasal mucosa pink and moist. No discharge.  Mouth / Throat: Normal dentition.  No oral lesions. Pharynx non erythematous, tonsils without hypertrophy.  Neck: Supple, no enlarged LN, trachea midline.   RESP: diffuse rhonchi and wheezes   CV: regular rate and rhythm, normal S1 S2, no S3 or S4, no murmur, click or rub, no peripheral edema and peripheral pulses strong  MS: no gross musculoskeletal defects noted, no edema      Labs reviewed        Signed Electronically by: Kleber Mix PA-C    "

## 2025-04-03 ENCOUNTER — OFFICE VISIT (OUTPATIENT)
Dept: FAMILY MEDICINE | Facility: CLINIC | Age: 35
End: 2025-04-03
Payer: COMMERCIAL

## 2025-04-03 VITALS
BODY MASS INDEX: 48.54 KG/M2 | TEMPERATURE: 98.6 F | RESPIRATION RATE: 16 BRPM | DIASTOLIC BLOOD PRESSURE: 90 MMHG | SYSTOLIC BLOOD PRESSURE: 144 MMHG | OXYGEN SATURATION: 98 % | WEIGHT: 274 LBS | HEART RATE: 88 BPM

## 2025-04-03 DIAGNOSIS — J06.9 UPPER RESPIRATORY TRACT INFECTION, UNSPECIFIED TYPE: ICD-10-CM

## 2025-04-03 DIAGNOSIS — I10 HYPERTENSION GOAL BP (BLOOD PRESSURE) < 140/90: Primary | ICD-10-CM

## 2025-04-03 RX ORDER — LISINOPRIL 20 MG/1
20 TABLET ORAL 2 TIMES DAILY
Qty: 180 TABLET | Refills: 1 | Status: SHIPPED | OUTPATIENT
Start: 2025-04-03

## 2025-04-03 RX ORDER — LISINOPRIL 20 MG/1
20 TABLET ORAL 2 TIMES DAILY
Qty: 180 TABLET | Refills: 1 | Status: SHIPPED | OUTPATIENT
Start: 2025-04-03 | End: 2025-04-03

## 2025-04-03 ASSESSMENT — PAIN SCALES - GENERAL: PAINLEVEL_OUTOF10: MODERATE PAIN (4)

## 2025-04-03 NOTE — PROGRESS NOTES
Assessment & Plan       ICD-10-CM    1. Hypertension goal BP (blood pressure) < 140/90  I10 lisinopril (ZESTRIL) 20 MG tablet     DISCONTINUED: lisinopril (ZESTRIL) 20 MG tablet      2. Upper respiratory tract infection, unspecified type  J06.9       Increase lisinopril 20mg twice a day. warning signs discussed. side effects discussed. Recheck 4 wks.   Warning signs discussed. Side effects discussed. Symptomatic treatment such as pushing fluids. Ok for over the counter acetaminophen and /or non-steroidal anti-inflammatory medication as needed.     The longitudinal plan of care for the diagnosis(es)/condition(s) as documented were addressed during this visit. Due to the added complexity in care, I will continue to support Dahlia in the subsequent management and with ongoing continuity of care.  Subjective   Dahlia is a 35 year old, presenting for the following health issues:  RECHECK    History of Present Illness       Reason for visit:  Coughing  Symptom onset:  1-2 weeks ago  Symptoms include:  Coughing  Symptom intensity:  Severe  Symptom progression:  Worsening  Had these symptoms before:  Yes  Has tried/received treatment for these symptoms:  Yes  Previous treatment was successful:  No  What makes it worse:  Coughing  What makes it better:  Cough drops   She is taking medications regularly.      Family sick. Cough, runny nose. No wheezing or short of breath.   Tx; over the counter meds.     Hypertension Follow-up    Do you check your blood pressure regularly outside of the clinic? Yes   Are you following a low salt diet? Yes  Are your blood pressures ever more than 140 on the top number (systolic) OR more   than 90 on the bottom number (diastolic), for example 140/90? Yes    BP Readings from Last 2 Encounters:   04/03/25 (!) 144/90   03/20/25 (!) 150/90     Review of Systems  Constitutional, HEENT, cardiovascular, pulmonary, gi and gu systems are negative, except as otherwise noted.      Objective    BP (!)  144/90   Pulse 88   Temp 98.6  F (37  C) (Tympanic)   Resp 16   Wt 124.3 kg (274 lb)   SpO2 98%   BMI 48.54 kg/m    Body mass index is 48.54 kg/m .  Physical Exam   GENERAL: alert and no distress  EYES: Eyes grossly normal to inspection, PERRL and conjunctivae and sclerae normal  HENT: ear canals and TM's normal, nose and mouth without ulcers or lesions  NECK: no adenopathy, no asymmetry, masses, or scars  RESP: lungs clear to auscultation - no rales, rhonchi or wheezes  CV: regular rate and rhythm, normal S1 S2, no S3 or S4, no murmur, click or rub.          Signed Electronically by: Kleber Mix PA-C

## 2025-05-29 DIAGNOSIS — I10 HYPERTENSION GOAL BP (BLOOD PRESSURE) < 140/90: ICD-10-CM

## 2025-05-29 RX ORDER — LISINOPRIL 20 MG/1
20 TABLET ORAL DAILY
Qty: 90 TABLET | Refills: 0 | Status: SHIPPED | OUTPATIENT
Start: 2025-05-29

## 2025-07-12 ENCOUNTER — HEALTH MAINTENANCE LETTER (OUTPATIENT)
Age: 35
End: 2025-07-12

## 2025-08-13 DIAGNOSIS — I10 HYPERTENSION GOAL BP (BLOOD PRESSURE) < 140/90: ICD-10-CM

## 2025-08-13 RX ORDER — LISINOPRIL 20 MG/1
20 TABLET ORAL DAILY
Qty: 90 TABLET | Refills: 0 | Status: SHIPPED | OUTPATIENT
Start: 2025-08-13